# Patient Record
Sex: MALE | Race: WHITE | NOT HISPANIC OR LATINO | URBAN - METROPOLITAN AREA
[De-identification: names, ages, dates, MRNs, and addresses within clinical notes are randomized per-mention and may not be internally consistent; named-entity substitution may affect disease eponyms.]

---

## 2024-07-29 ENCOUNTER — HOSPITAL ENCOUNTER (OUTPATIENT)
Dept: RADIOLOGY | Facility: HOSPITAL | Age: 54
Discharge: HOME | End: 2024-07-29
Payer: COMMERCIAL

## 2024-07-29 DIAGNOSIS — S49.92XA UNSPECIFIED INJURY OF LEFT SHOULDER AND UPPER ARM, INITIAL ENCOUNTER: ICD-10-CM

## 2024-07-29 PROCEDURE — 73030 X-RAY EXAM OF SHOULDER: CPT | Mod: LEFT SIDE | Performed by: RADIOLOGY

## 2024-07-29 PROCEDURE — 73030 X-RAY EXAM OF SHOULDER: CPT | Mod: LT

## 2024-09-07 ENCOUNTER — HOSPITAL ENCOUNTER (OUTPATIENT)
Dept: RADIOLOGY | Facility: HOSPITAL | Age: 54
Discharge: HOME | End: 2024-09-07
Payer: COMMERCIAL

## 2024-09-07 DIAGNOSIS — M79.602 PAIN IN LEFT ARM: ICD-10-CM

## 2024-09-07 PROCEDURE — 73221 MRI JOINT UPR EXTREM W/O DYE: CPT | Performed by: RADIOLOGY

## 2024-09-07 PROCEDURE — 73221 MRI JOINT UPR EXTREM W/O DYE: CPT

## 2024-09-25 ENCOUNTER — EVALUATION (OUTPATIENT)
Dept: PHYSICAL THERAPY | Facility: CLINIC | Age: 54
End: 2024-09-25
Payer: COMMERCIAL

## 2024-09-25 ENCOUNTER — APPOINTMENT (OUTPATIENT)
Dept: PHYSICAL THERAPY | Facility: CLINIC | Age: 54
End: 2024-09-25
Payer: COMMERCIAL

## 2024-09-25 DIAGNOSIS — S43.402A UNSPECIFIED SPRAIN OF LEFT SHOULDER JOINT, INITIAL ENCOUNTER: ICD-10-CM

## 2024-09-25 PROCEDURE — 97110 THERAPEUTIC EXERCISES: CPT | Mod: GP

## 2024-09-25 PROCEDURE — 97161 PT EVAL LOW COMPLEX 20 MIN: CPT | Mod: GP

## 2024-09-25 NOTE — PROGRESS NOTES
Physical Therapy    Physical Therapy Evaluation    Patient Name: Jonah Andujar  MRN: [id number]  Today's Date: 9/25/2024    Time Entry:  Time Calculation  Start Time: 1015  Stop Time: 1055  Time Calculation (min): 40 min  PT Evaluation Time Entry  PT Evaluation (Low) Time Entry: 25  PT Therapeutic Procedures Time Entry  Therapeutic Exercise Time Entry: 15                   Assessment  PT Assessment Results: Decreased strength, Decreased range of motion, Decreased endurance, Decreased mobility, Pain  Rehab Prognosis: Ramana is a 54 year old M who participated in a physical therapy evaluation today due to L shoulder pain . His impairments include;  tenderness, strength, pain, swelling, ROM deficits, motor control]. Due to these impairments, pt has the following functional limitations [ Gait deviations, increased fall risk, difficulty with sleeping, stair negotiation, transfers, performing household/recreational/ occupational activities, and performing ADLs]. Pt will benefit from continued skilled physical therapy to address above impairments and progress toward therapy related goals.       Plan  Treatment/Interventions: Dry needling, Electrical stimulation, Education/ Instruction, Manual therapy, Neuromuscular re-education, Therapeutic exercises, Therapeutic activities, Taping techniques  PT Plan: Skilled PT  PT Frequency: 1 time per week  Duration: 6 weeks  Onset Date: 07/25/24  Number of Treatments Authorized: 60 PT/OT  Rehab Potential: Good  Plan of Care Agreement: Patient    Current Problem  1. Unspecified sprain of left shoulder joint, initial encounter  Referral to Physical Therapy    Follow Up In Physical Therapy          Subjective   RFV: L Shoulder pain, pt reports that he tripped over a small fence back in July and landed on his L shoulder. Received cortisone injection recently which seemed to help with the intensity of pain. Still feels achy. Pt has been out of work since July because of the injury and is  hoping to get back to work ASAP. Pt works at [employer] and is required to lift heavy equipment up to 150 lbs. pt Will follow up with Dr. Funk 10/07  Pain: 2/10   Description of pain:  Better/worse:Overhead limitation,   Acute/Chronic: Acute   Work: [employer], lifts heavy equipment       Objective      Shoulder     Cervical AROM     Shoulder AROM  L Shoulder flexion: (180°): p! at end range  L Shoulder abduction: (180°): p! at end range (better in abd, horizontal abd in sidelying also ok)  Shoulder PROM  Shoulder PROM WFL:  (decreased pain with PROM, but still present)  Cervical Myotomes (MMT     Shoulder Strength  L shoulder flexion: (5/5): 4-p!  R shoulder abduction: (5/5): 4-p!  L shoulder ER: (5/5): 4-p!  Scapular MMT     DTR      Shoulder Special  Rotator Cuff Involvement (If 3 are positive likelihood ration +15.6; If 2 are positive likelihood ration +3.57): +  Painful arc: Negative: +  Shoulder Impingement (If 3 are positive likelihood ration +10.56 and -0.17; If 2 are positive likelihoood ration +5.03): +  Neer: (Negative): +        OP EDUCATION:  Outpatient Education  Individual(s) Educated: Patient  Risk and Benefits Discussed with Patient/Caregiver/Other: yes  Patient/Caregiver Demonstrated Understanding: yes  Plan of Care Discussed and Agreed Upon: yes  Patient Response to Education: Patient/Caregiver Asked Appropriate Questions, Patient/Caregiver Performed Return Demonstration of Exercises/Activities, Patient/Caregiver Verbalized Understanding of Information    Treatment: Access Code: [email or web address]  URL: [email or web address]  Date: 09/25/2024  Prepared by: Sabino Vazquez    Exercises  - Shoulder Flexion Wall Slide with Towel  - 1 x daily - 7 x weekly - 3 sets - 10 reps  - Standing Isometric Shoulder External Rotation with Doorway  - 1 x daily - 7 x weekly - 3 sets - 30 hold  - Isometric Shoulder Flexion at Wall  - 1 x daily - 7 x weekly - 3 sets - 30 hold  - Standing Bicep Curls Supinated with  Dumbbells  - 1 x daily - 7 x weekly - 3 sets - 10 reps    Goals:  Active       PT Problem       Pt will demonstrate 4+/5 B/L UE  strength        Start:  09/25/24            Pt will demonstrate symmetrical pain free AROM in B/L UEs        Start:  09/25/24            pt will report no greater than 2/10 pain for 3 consecutive days         Start:  09/25/24            Pt will be indep with HEP         Start:  09/25/24

## 2024-10-04 ENCOUNTER — APPOINTMENT (OUTPATIENT)
Dept: PHYSICAL THERAPY | Facility: CLINIC | Age: 54
End: 2024-10-04
Payer: COMMERCIAL

## 2024-10-04 DIAGNOSIS — S43.402A UNSPECIFIED SPRAIN OF LEFT SHOULDER JOINT, INITIAL ENCOUNTER: ICD-10-CM

## 2024-10-04 PROCEDURE — 97110 THERAPEUTIC EXERCISES: CPT | Mod: GP,CQ

## 2024-10-04 NOTE — PROGRESS NOTES
Physical Therapy Treatment    Patient Name: Jonah Andujar  MRN: [id number]  Today's Date: 10/4/2024  Time Calculation  Start Time: 0845  Stop Time: 0915  Time Calculation (min): 30 min  PT Therapeutic Procedures Time Entry  Therapeutic Exercise Time Entry: 30    Insurance:  Visit number: 2 of 5  Authorization info: NO AUTH/ $60 COPAY PER VISIT/ $6000 OOP (NM)/ 100% COVERAGE/ 60 PT/OT MX VISITS/ PER ANGIE WITH [employer] REF# [id number]  Insurance Type: Payor: [employer] [facility] / Plan: [employer] / Product Type: *No Product type* /     Current Problem   1. Unspecified sprain of left shoulder joint, initial encounter  Follow Up In Physical Therapy          Subjective   General    Pt feels he is getting better.  Precautions:   None  Pain    Minimal  Post Treatment Pain Level Fatigued    Objective   L shoulder ROM equal to R    Treatments:  Therapeutic Exercise:   UBE retro x3'  Scap add with LA's with PBTB x 30  Hoorizontal ABD with OTB x 30  ER with OTB 2 x 15   Scaption with #2 DB 2 x 15      Assessment   Assessment:    Pt had no pain with new there ex.    Plan:    Progress RC and scapular strengthening as able.    OP EDUCATION:   Updated HEP    Goals:   Active       PT Problem       Pt will demonstrate 4+/5 B/L UE  strength        Start:  09/25/24            Pt will demonstrate symmetrical pain free AROM in B/L UEs        Start:  09/25/24            pt will report no greater than 2/10 pain for 3 consecutive days         Start:  09/25/24            Pt will be indep with HEP         Start:  09/25/24

## 2024-10-10 ENCOUNTER — APPOINTMENT (OUTPATIENT)
Dept: PHYSICAL THERAPY | Facility: CLINIC | Age: 54
End: 2024-10-10
Payer: COMMERCIAL

## 2024-11-26 ENCOUNTER — DOCUMENTATION (OUTPATIENT)
Dept: PHYSICAL THERAPY | Facility: CLINIC | Age: 54
End: 2024-11-26
Payer: COMMERCIAL

## 2024-11-26 NOTE — PROGRESS NOTES
Physical Therapy    Discharge Summary    Name: Jonah Andujar  MRN: [id number]  : 1970  Date: 24    Discharge Summary: PT    Discharge Information: Date of discharge       Rehab Discharge Reason: Achieved all and/or the most significant goals(s)

## 2024-12-31 ENCOUNTER — OFFICE VISIT (OUTPATIENT)
Dept: URGENT CARE | Age: 54
End: 2024-12-31
Payer: COMMERCIAL

## 2024-12-31 VITALS
HEART RATE: 76 BPM | TEMPERATURE: 97.4 F | HEIGHT: 71 IN | DIASTOLIC BLOOD PRESSURE: 99 MMHG | BODY MASS INDEX: 40.6 KG/M2 | WEIGHT: 290 LBS | OXYGEN SATURATION: 94 % | SYSTOLIC BLOOD PRESSURE: 162 MMHG

## 2024-12-31 DIAGNOSIS — R05.1 ACUTE COUGH: Primary | ICD-10-CM

## 2024-12-31 PROCEDURE — 99213 OFFICE O/P EST LOW 20 MIN: CPT | Performed by: NURSE PRACTITIONER

## 2024-12-31 PROCEDURE — 3008F BODY MASS INDEX DOCD: CPT | Performed by: NURSE PRACTITIONER

## 2024-12-31 RX ORDER — AZITHROMYCIN 250 MG/1
TABLET, FILM COATED ORAL
Qty: 6 TABLET | Refills: 0 | Status: SHIPPED | OUTPATIENT
Start: 2024-12-31

## 2024-12-31 RX ORDER — BENZONATATE 100 MG/1
100 CAPSULE ORAL 3 TIMES DAILY PRN
Qty: 60 CAPSULE | Refills: 0 | Status: SHIPPED | OUTPATIENT
Start: 2024-12-31

## 2024-12-31 ASSESSMENT — ENCOUNTER SYMPTOMS
SINUS PRESSURE: 1
SINUS PAIN: 1
COUGH: 1

## 2024-12-31 ASSESSMENT — PAIN SCALES - GENERAL: PAINLEVEL_OUTOF10: 2

## 2024-12-31 NOTE — PROGRESS NOTES
"Subjective   Patient ID: Jonah Andujar is a 54 y.o. male. They present today with a chief complaint of Cough (Patient c/o cough and phlegm since kamar. ).    History of Present Illness  Cough for approx 7 days started in the sinuses and has gone to his lungs      Cough        Past Medical History  Allergies as of 12/31/2024    (No Known Allergies)       (Not in a hospital admission)       No past medical history on file.    No past surgical history on file.         Review of Systems  Review of Systems   HENT:  Positive for congestion, sinus pressure and sinus pain.    Respiratory:  Positive for cough.    All other systems reviewed and are negative.                                 Objective    Vitals:    12/31/24 0922   BP: (!) 162/99   Pulse: 76   Temp: 36.3 °C (97.4 °F)   SpO2: 94%   Weight: 132 kg (290 lb)   Height: 1.803 m (5' 11\")     No LMP for male patient.    Physical Exam  Vitals reviewed.   Constitutional:       Appearance: Normal appearance.   HENT:      Right Ear: Hearing, tympanic membrane, ear canal and external ear normal.      Left Ear: Hearing, tympanic membrane, ear canal and external ear normal.      Nose: Nose normal.      Mouth/Throat:      Lips: Pink.      Mouth: Mucous membranes are moist.      Pharynx: Oropharynx is clear. Uvula midline.   Cardiovascular:      Rate and Rhythm: Normal rate and regular rhythm.      Pulses: Normal pulses.      Heart sounds: Normal heart sounds.   Pulmonary:      Effort: Pulmonary effort is normal.      Breath sounds: Normal breath sounds.   Neurological:      Mental Status: He is alert.         Procedures    Point of Care Test & Imaging Results from this visit  No results found for this visit on 12/31/24.   No results found.    Diagnostic study results (if any) were reviewed by [facility].    Assessment/Plan   Allergies, medications, history, and pertinent labs/EKGs/Imaging reviewed by Mirta Esquivel, APRN-CNP.     Medical Decision " Making  Start zpak and tessalon for the cough and congestion  Will cover for atypical uri    Orders and Diagnoses  Encounter Diagnosis   Name Primary?    Acute cough Yes         Medical Admin Record      Patient disposition: Home    Electronically signed by [facility]  9:25 AM